# Patient Record
Sex: FEMALE | Race: WHITE | URBAN - METROPOLITAN AREA
[De-identification: names, ages, dates, MRNs, and addresses within clinical notes are randomized per-mention and may not be internally consistent; named-entity substitution may affect disease eponyms.]

---

## 2021-05-17 ENCOUNTER — NURSE TRIAGE (OUTPATIENT)
Dept: NURSING | Facility: CLINIC | Age: 21
End: 2021-05-17

## 2021-05-18 NOTE — TELEPHONE ENCOUNTER
Pt is calling.    For the past 4 days her eye has been very itchy, painful, stings. She thought that it was allergies, but it isn't getting any better. Just worse.   Itches, and itching doesn't stops. Watery eyes.  No pus discharge. Denies nasal congestion or runny nose or new cough. Denies fever.   Whites of the eyes get pinkish red when she itches them. Then it goes away.  Taking Claritin and Zyrtec with no relief. Also tried Similason allergy eye drops with no relief as well. Started that today. No redness around her eyes, but it is slightly swollen due to itching at them.   Care advice reviewed. Encouraged to continue Zyrtec, add Benadryl 50 mg at bedtime and every 6 hours as needed. Encouraged to try Pataday eye drops, cold compresses for 10 minutes several times a day.  Schedule appointment to be seen in the next few days. She stated that she will go into urgent care to be seen tomorrow.    Additional Information    Negative: Chemical gets into the eye from fingers, contaminated object, spray, or splash    Negative: Eye pain    Negative: Runny nose and sneezing also present    Negative: Reaction to antibiotic eye drops    Negative: Doesn't match SYMPTOMS of eye allergy    Negative: [1] Sacs of clear  fluid (blisters) on whites of eyes AND [2] more than mild AND [3] not improved 2 hours after allergy treatment per protocol    Negative: [1] Blurred vision AND [2] new or worsening    Negative: Sacs of clear fluid (blisters) on whites of eyes or inner lids    Negative: Eyelids are very swollen (shut or almost)    [1] Taking allergy medicine > 2 days AND [2] eyes are very itchy    Negative: [1] Taking allergy medicine > 2 days AND [2] pus remains on eyelids.    Protocols used: EYE - ALLERGY-A-    Melonie Griffin RN  [facility] Nurse Advisor  5/17/2021 at 9:45 PM